# Patient Record
(demographics unavailable — no encounter records)

---

## 2024-10-11 NOTE — HISTORY OF PRESENT ILLNESS
[FreeTextEntry1] : Ms. Hay is a 43-year-old female presenting to establish care for her right knee and neck pain. She gives a chronic history of having right knee pain. She has been seen by Orthopedics and was told that due to her insurance she is unable to undergo injections. She notes pain around the medial aspect of the knee. Pain is made worse with weight bearing or going up the stairs. Pain has been present for many years however over the last couple of days, the pain has been progressing.   With regards to her neck pain, she has been presenting with worsening pain. She has undergone 6 weeks of physical therapy which did relieve her pain however the pain has come back and is progressing. She does work for [employer] which flares up her pain daily due to the constant lifting of heavy objects. She notes pain which is on the left side of the neck and refers into the shoulder and into the left worse than right upper extremity. She notes stiffness, spasms, sharp and shooting pain. She also complains of numbness and tingling all the way into the fingers. Symptoms are present daily. She denies any changes in bowel/bladder habits, fevers/chills or any recent weight loss.   Of note, she has Diabetes and is on Insulin. She monitors her sugar levels daily. Her sugar level this morning was 118.   8-: Revisit encounter.   With regards to her right knee pain, she underwent a right knee injection at her last visit and only notes about 50% relief for about 2 weeks. She continues with ongoing throbbing pain. Pain continues to be around the medial aspect of the knee. Pain is made worse with weight bearing movements especially with going up and down the stairs. She notes instability with walking along with swelling. She also notes her leg locking up if her leg is in an extended position for long periods of time. Symptoms are present daily.   With regards to her neck pain, she continues with ongoing pain. Pain is in the cervical spine and refers into the left worse than right upper extremity. Symptoms are associated with sharp, shooting pains. She also continues with significant stiffness in the neck. There is numbness and tingling which refers all the way into the fingers. Her arm is significant tender to the touch or when leaning on any object. She also notes trouble with grasping objects. She tends to drop objects from time to time. Pain is present daily.   9-3-2024: Revisit encounter.   With regards to her right knee pain, she continues today with ongoing pain. She denies any worsening pain or any new injuries. Pain is around the medial aspect of the knee. Her pain continues to be the most severe when weight bearing, the most severe when going up and down the stairs. She does have ongoing swelling in the knees. Pain is present daily.   With regards to her neck pain, she has been experiencing pain which is referring into the left shoulder. She feels like the majority of her pain in mainly in the shoulder and referring into the hand. She has numbness and tingling which refers into the forearm and into the hand. She cannot apply pressure on the arm when the symptoms are severe.   She has been managing her pain with Naproxen 500 mg q12h prn. She notes about 30% sustained relief with this medication.   9-: Revisit encounter.   With regards to her right knee, she was recently seen by Orthopedics and is scheduled to undergo surgical correction in October. She denies any acute changes or any new injuries. Pain continues to be around the medial aspect of the knee. Pain is made worse with weight bearing. Pain is present daily.   With regards to her left shoulder pain, she continues with ongoing pain. Her range of motion is limited as she does feel pain in her shoulder when rotating her shoulder or doing overhead movements. She does have continued pain which starts in the neck and refers into the left arm. There is sharp, shooting pains with some numbness and tingling at times. She has also been experiencing numbness and tingling when she leans on her elbow.   She has been managing her pain with Naproxen 500 mg q12h prn. She affords about 20-30% reduction in pain and increase in function with the use of this medication.   10-9-2024: Revisit encounter.   She is presenting today with a severe flare up of neck pain. She states the neck pain was so bad that she had to go to the ER due to the pain. Her pain has been in the left side of the cervical spine with referred pain into the left shoulder and into the left upper extremity. She also notes pain when rotating the head or tilting in from side to side. Her pain is associated with sharp, shooting, stabbing pains along with weakness in  strength in the left arm. She also notes some numbness and tingling. Her pain is present daily and has significantly impacting her quality of life. She also has a significant amount of pain when trying to lift her up arm. She cannot extend her are fully up as she gets sharp pains in her shoulder. She has an appointment with Neurosurgery on 10-.   Since her last visit, she was seen by Orthopedics for her right knee pain.

## 2024-10-11 NOTE — DISCUSSION/SUMMARY
[de-identified] : A discussion regarding available pain management treatment options occurred with the patient. These included interventional, rehabilitative, pharmacological, and alternative modalities. We will proceed with the following:    Interventional treatment options: 1. Proceed with a C7-T1 cervical epidural steroid injection with sedation.  Treatment options were discussed. The patient has been having persistent, severe neck pain and cervical radicular pain that has minimally improved with conservative therapy thus far (including physical therapy/chiropractic manipulations/home stretching and anti-inflammatory medications for 8 weeks. The patient was given the option of proceeding with a cervical epidural steroid injection to try to get the patient some pain relief.    The patient has severe anxiety of procedures that necessitates monitored anesthesia care (MAC). The procedure performed will be close to major nerves, arteries, and spinal cord and/or joint structures. Due to the proximity of these structures, we need the patient to be still during the procedure.  With the help of MAC, this will be safely achieved and decrease the risk of any complications.   Medications: 1. Ordered Gabapentin 300 mg q8h prn.   We are going to start gabapentin. Potential side effects were discussed which included dizziness, sedation, and pedal edema to name a few. If the patient cannot tolerate these side effects should they occur, then they will stop the medication. If the patient experiences sedation, they understand that they should not drive. The usage of the medication was discussed and the patient understands that it is an anti-epileptic medication used for neuropathic pain and that the pain relief from this medication will likely be subtle, and that hopefully in combination with the other treatments we are offering we will be able to get some additive relief.   - Follow up 2 weeks post injection.   I Mary Ellen Mcgee attest that this documentation has been prepared under the direction and in the presence of provider Dr. Geovanny Elliott.  The documentation recorded by the scribe in my presence, accurately reflects the service I personally performed, and the decisions made by me with my edits as appropriate.   Geovanny Elliott, DO

## 2024-10-11 NOTE — DATA REVIEWED
[FreeTextEntry1] : MRI of the cervical spine taken on 8-3-2024 showed IMPRESSION: No significant change in comparison to prior MRI of 7/19/2022. Mild multilevel degenerative changes and disc bulging without significant spinal canal or neuroforaminal narrowing as further detailed above.  MRI of the right knee taken on 9-3-2024 showed IMPRESSION: Osteoarthritic changes. Joint effusion. Bone bruises within the medial femoral condyle subchondral region and more significant bone bruise lateral tibial plateau. Possible insufficiency fracture. Extensive complex tear of the lateral meniscus suggestion of a flipped meniscal fragment. Bowing iliotibial band is suggestion of associated sprain. Possible tear periphery posterior horn the medial meniscus at junction with capsule. Prominent red marrow. Correlate with underlying anemia or history of smoking.  MRI of the left shoulder taken @ [facility] on 9- showed Os acromiae, with fluid in the synchondrosis and bone marrow edema across the synchondrosis. Supraspinatus and infraspinatus insertional tendinosis with partial thick ness anterior infraspinatus interstitial tear comprising less than 50% of tendon thickness.   EMG of the upper extremities taken on 9- showed evidence of median nerve compression at both wrists, consistent with bilateral carpal tunnel syndrome, mild. Evidence of left ulnar neuropathy at the elbow.

## 2024-10-11 NOTE — PHYSICAL EXAM
[de-identified] : Cervical Spine Exam:   Inspection:  erythema (-)   ecchymosis (-)  rashes (-)       Palpation:   Cervical paraspinal mm tenderness: R (-); L(-)  Upper trapezius mm tenderness: R (-); L (-)   Rhomboids tenderness: R (-); L (-)  Scalenes mm tenderness: R (-); L (-)  Occipital Ridge: R (-); L (-)  Supraspinatus mm tenderness: R (-); L (-)   ROM:  limited rom 2/2 to pain   Strength Testing:  Right Left  Deltoid (5/5) (5/5)  Biceps: (5/5) (5/5)   Triceps: (5/5) (4/5)   Finger Abductors: (5/5) (5/5)   Grasp: (5/5) (4/5)       Special Testing:  Spurling Test: R (-); L (-)  Facet load test: R (-); L (-)  Right knee: Inspection/palpation   Negative swelling, erythema, warmth  Negative tenderness to palpation   Negative crepitus    ROM:   Flexion 135 (normal 120-150)  Extension 0 (normal 0-15)   Tests:   Negative anterior drawer test   Negative Prachi's test  There is no varus or valgus instability  Quad strength is 5/5, hamstring strength is 5/5   R knee  No rashes, erythema, mild edema noted   TTP at medial joint line Stability: + instability with weight bearing ROM: Painful ROM with flexion Gait: cautious, antalgic  L shoulder exam  No rashes, erythema, edema  TTP at anterior & posterior GH joint Limited ROM 2/2 to pain Lyman sign: negative O'briens test: negative LUE: 5/5 strength except 4+/5 deltoid

## 2024-10-11 NOTE — DATA REVIEWED
[FreeTextEntry1] : MRI of the cervical spine taken on 8-3-2024 showed IMPRESSION: No significant change in comparison to prior MRI of 7/19/2022. Mild multilevel degenerative changes and disc bulging without significant spinal canal or neuroforaminal narrowing as further detailed above.  MRI of the right knee taken on 9-3-2024 showed IMPRESSION: Osteoarthritic changes. Joint effusion. Bone bruises within the medial femoral condyle subchondral region and more significant bone bruise lateral tibial plateau. Possible insufficiency fracture. Extensive complex tear of the lateral meniscus suggestion of a flipped meniscal fragment. Bowing iliotibial band is suggestion of associated sprain. Possible tear periphery posterior horn the medial meniscus at junction with capsule. Prominent red marrow. Correlate with underlying anemia or history of smoking.  MRI of the left shoulder taken @ [facility] on 9- showed Os acromiae, with fluid in the synchondrosis and bone marrow edema across the synchondrosis. Supraspinatus and infraspinatus insertional tendinosis with partial thick ness anterior infraspinatus interstitial tear comprising less than 50% of tendon thickness.   EMG of the upper extremities taken on 9- showed evidence of median nerve compression at both wrists, consistent with bilateral carpal tunnel syndrome, mild. Evidence of left ulnar neuropathy at the elbow.

## 2024-10-11 NOTE — PHYSICAL EXAM
[de-identified] : Cervical Spine Exam:   Inspection:  erythema (-)   ecchymosis (-)  rashes (-)       Palpation:   Cervical paraspinal mm tenderness: R (-); L(-)  Upper trapezius mm tenderness: R (-); L (-)   Rhomboids tenderness: R (-); L (-)  Scalenes mm tenderness: R (-); L (-)  Occipital Ridge: R (-); L (-)  Supraspinatus mm tenderness: R (-); L (-)   ROM:  limited rom 2/2 to pain   Strength Testing:  Right Left  Deltoid (5/5) (5/5)  Biceps: (5/5) (5/5)   Triceps: (5/5) (4/5)   Finger Abductors: (5/5) (5/5)   Grasp: (5/5) (4/5)       Special Testing:  Spurling Test: R (-); L (-)  Facet load test: R (-); L (-)  Right knee: Inspection/palpation   Negative swelling, erythema, warmth  Negative tenderness to palpation   Negative crepitus    ROM:   Flexion 135 (normal 120-150)  Extension 0 (normal 0-15)   Tests:   Negative anterior drawer test   Negative Prachi's test  There is no varus or valgus instability  Quad strength is 5/5, hamstring strength is 5/5   R knee  No rashes, erythema, mild edema noted   TTP at medial joint line Stability: + instability with weight bearing ROM: Painful ROM with flexion Gait: cautious, antalgic  L shoulder exam  No rashes, erythema, edema  TTP at anterior & posterior GH joint Limited ROM 2/2 to pain Lyman sign: negative O'briens test: negative LUE: 5/5 strength except 4+/5 deltoid

## 2024-10-11 NOTE — DISCUSSION/SUMMARY
[de-identified] : A discussion regarding available pain management treatment options occurred with the patient. These included interventional, rehabilitative, pharmacological, and alternative modalities. We will proceed with the following:    Interventional treatment options: 1. Proceed with a C7-T1 cervical epidural steroid injection with sedation.  Treatment options were discussed. The patient has been having persistent, severe neck pain and cervical radicular pain that has minimally improved with conservative therapy thus far (including physical therapy/chiropractic manipulations/home stretching and anti-inflammatory medications for 8 weeks. The patient was given the option of proceeding with a cervical epidural steroid injection to try to get the patient some pain relief.    The patient has severe anxiety of procedures that necessitates monitored anesthesia care (MAC). The procedure performed will be close to major nerves, arteries, and spinal cord and/or joint structures. Due to the proximity of these structures, we need the patient to be still during the procedure.  With the help of MAC, this will be safely achieved and decrease the risk of any complications.   Medications: 1. Ordered Gabapentin 300 mg q8h prn.   We are going to start gabapentin. Potential side effects were discussed which included dizziness, sedation, and pedal edema to name a few. If the patient cannot tolerate these side effects should they occur, then they will stop the medication. If the patient experiences sedation, they understand that they should not drive. The usage of the medication was discussed and the patient understands that it is an anti-epileptic medication used for neuropathic pain and that the pain relief from this medication will likely be subtle, and that hopefully in combination with the other treatments we are offering we will be able to get some additive relief.   - Follow up 2 weeks post injection.   I Mary Ellen Mcgee attest that this documentation has been prepared under the direction and in the presence of provider Dr. Geovanny Elliott.  The documentation recorded by the scribe in my presence, accurately reflects the service I personally performed, and the decisions made by me with my edits as appropriate.   Geovanny Elliott, DO

## 2024-10-30 NOTE — PROCEDURE
[FreeTextEntry3] : Date of Service: 10/28/2024   Account: [id number]  Patient: SHASHA WILEY   YOB: 1980  Age: 44 year  Surgeon:      Geovanny Elliott D.O.  Assistant:    None  Pre-Operative Diagnosis:         Cervical Radiculopathy (M54.12)  Post Operative Diagnosis:       Cervical Radiculopathy (M54.12)  Procedure:             Cervical (C7-T1) interlaminar epidural steroid injection under fluoroscopic guidance  Anesthesia:  MAC  This procedure was carried out using fluoroscopic guidance.  The risks and benefits of the procedure were discussed extensively with the patient.  The consent of the patient was obtained and the following procedure was performed. The patient was placed in the prone position on the fluoroscopy table and the area was prepped and draped in a sterile fashion.  A timeout was performed with all essential staff present and the site and side were verified.  The patient was placed in the prone position and optimized to patient comfort.  The cervical area was prepped and draped in a sterile fashion.  The fluoroscope visualized the C7-T1 interspace using slight cephalad-caudad angulation and this area was marked.  Using sterile technique the superficial skin was anesthetized with 1% Lidocaine.  A 20 gauge 3.5 inch Tuohy needle was advanced under fluoroscopy until ligament was engaged.  Using a contralateral oblique view, a "loss of resistance" to air technique was utilized in order to gain access to the epidural space.  After negative aspiration for heme and CSF, 1 cc of Omnipaque contrast was administered and the appropriate cervical epidurogram was obtained in the ALFREDO and A/P view as well as digital subtraction angiography.  A total injectate of 3 cc of preservative free normal saline and 10mg decadron was then injected into the epidural space while maintaining meaningful verbal contact with the patient.    The needle was subsequently removed.  Vital signs remained normal.  Pulse oximeter was used throughout the procedure and the patient's pulse and oxygen saturation remained within normal limits.  The patient tolerated the procedure well.  There were no complications.  The patient was instructed to apply ice over the injection sites for twenty minutes every two hours for the next 24 to 48 hours.  Disposition:       1. The patient was advised to F/U in 1-2 weeks to assess the response to the injection.      2. The patient was also instructed to contact me immediately if there were any concerns related to the procedure performed.

## 2024-11-20 NOTE — PHYSICAL EXAM
[de-identified] : Cervical Spine Exam:   Inspection:  erythema (-)   ecchymosis (-)  rashes (-)       Palpation:   Cervical paraspinal mm tenderness: R (-); L(-)  Upper trapezius mm tenderness: R (-); L (-)   Rhomboids tenderness: R (-); L (-)  Scalenes mm tenderness: R (-); L (-)  Occipital Ridge: R (-); L (-)  Supraspinatus mm tenderness: R (-); L (-)   Strength Testing:  Right Left  Deltoid (5/5) (5/5)  Biceps: (5/5) (5/5)   Triceps: (5/5) (4/5)   Finger Abductors: (5/5) (5/5)   Grasp: (5/5) (4/5)       Special Testing:  Spurling Test: R (-); L (-)  Facet load test: R (-); L (-)

## 2024-11-20 NOTE — DISCUSSION/SUMMARY
[de-identified] : A discussion regarding available pain management treatment options occurred with the patient. These included interventional, rehabilitative, pharmacological, and alternative modalities. We will proceed with the following:    Interventional treatment options: 1. None indicated at this time   Medications: 1. C/W Gabapentin 300 mg q8h prn.  Potentil side effects were discussed which included dizziness, sedation, and pedal edema to name a few. If the patient cannot tolerate these side effects should they occur, then they will stop the medication. If the patient experiences sedation, they understand that they should not drive. The usage of the medication was discussed and the patient understands that it is an anti-epileptic medication used for neuropathic pain and that the pain relief from this medication will likely be subtle, and that hopefully in combination with the other treatments we are offering we will be able to get some additive relief.  2. C/W Naproxen 500 mg BID PRN  - I advised the patient that the NSAID should be taken with food. In addition, while taking the prescribed NSAID, no over the counter or other NSAIDs should be used, such as ibuprofen (Motrin or Advil) or naproxen (Aleve) as this can cause stomach upset or other side effects. If needed for fever or breakthrough pain Tylenol can be used.  - Follow up in 8 weeks (pt knows to call our office in the interim if the experiences any pain)   LORI Hercules DO

## 2024-11-20 NOTE — DATA REVIEWED
[FreeTextEntry1] : MRI of the cervical spine taken on 8-3-2024 showed IMPRESSION: No significant change in comparison to prior MRI of 7/19/2022. Mild multilevel degenerative changes and disc bulging without significant spinal canal or neuroforaminal narrowing as further detailed above.  MRI of the right knee taken on 9-3-2024 showed IMPRESSION: Osteoarthritic changes. Joint effusion. Bone bruises within the medial femoral condyle subchondral region and more significant bone bruise lateral tibial plateau. Possible insufficiency fracture. Extensive complex tear of the lateral meniscus suggestion of a flipped meniscal fragment. Bowing iliotibial band is suggestion of associated sprain. Possible tear periphery posterior horn the medial meniscus at junction with capsule. Prominent red marrow. Correlate with underlying anemia or history of smoking.  MRI of the left shoulder taken @ [facility] on 9- showed Os acromiae, with fluid in the synchondrosis and bone marrow edema across the synchondrosis. Supraspinatus and infraspinatus insertional tendinosis with partial thick ness anterior infraspinatus interstitial tear comprising less than 50% of tendon thickness.   EMG of the upper extremities taken on 9- showed evidence of median nerve compression at both wrists, consistent with bilateral carpal tunnel syndrome, mild. Evidence of left ulnar neuropathy at the elbow.

## 2024-12-04 NOTE — ASSESSMENT
[FreeTextEntry1] : This is a 44-year-old female who presents for neurosurgical revisit with regards to cervical radiculopathy affecting the left upper extremity.  Since our her last encounter she has undergone an epidural injection and has also quit her job at [employer] and notes excellent relief with a near complete resolution of her left upper extremity radiculopathy.  She is to initiate conservative efforts in the form of physical therapy but will likely start after the next 2 weeks that she is pending right knee surgery.  At this time, there is no indication for neurosurgical intervention as she is improving through conservative/interventional efforts.  I will have her return to our office in 2 months to outline her continued improvements and to determine her continued treatment plan.  All questions and concerns have been answered at this time and she is largely agreeable with the proposed course of action.  LORI Noriega MD

## 2024-12-04 NOTE — HISTORY OF PRESENT ILLNESS
[FreeTextEntry1] : This is a 44-year-old female who presents for neurosurgical revisit.  She was seen in the office approximately 6 to 8 weeks prior with reports of severe cervical radiculopathy affecting the left upper extremity.  Since our last encounter she has undergone an epidural with pain management and has also quit her job at [employer].  She notes excellent relief with significant improvements in the intensity of her radiating pain involving the left arm.  At this time, she notes a painful paresthesia involving the left thumb but otherwise no true radicular complaints reported.  Left shoulder pain continues as well as right knee pain.  She is pending meniscal repair to the right knee which will take place on November 23, 2024.  With regards to her left shoulder, she has been advised to initiate conservative efforts in the form of PT.  IMAGES: MR SPINE CERVICAL ([facility] 08/03/2024) 1. No significant change in comparison to prior MRI of 7/19/2022. 2. Mild multilevel degenerative changes and disc bulging without significant spinal canal or neuroforaminal narrowing as further detailed above.  MR SHOULDER LT ([facility] 08/03/2024) 1. Osacromiale, with fluid in the synchondrosis and bone marrow edema across the synchondrosis. Correlate clinically for symptoms of pain. 2.Supraspinatus and infraspinatus insertional tendinosis with partial-thickness anterior infraspinatus interstitial tear comprising less than 50% of tendon thickness.  EXAM: Neurologic: CN II-XII grossly intact Palpation: L anterior shoulder pain Strength: Full strength in all major muscle groups, no atrophy Sensation: Full sensation to light touch in all extremities  Reflexes: 2+ patellar 2+ biceps   ROM limited in the left shoulder through all planes with passive movement  Signs: SLR negative; Ronald's maneuver negative  tenderness over left shoulder  Gait: WNL

## 2024-12-31 NOTE — DISCUSSION/SUMMARY
[de-identified] : Postop visit #1  HPI Patient is a 44-year-old female who reports to the office for her first postoperative visit.  She is status post right knee arthroscopy/lateral meniscectomy done on 12/23/2024 by Dr. Staples.  She is doing well and her pain has been well-controlled.  She has been ambulating with a cane.  Certain range of motion and palpating certain areas aggravate the patient's pain.  Denies any numbness or tingling.  Right knee exam is as follows: Minimal effusion noted.  No erythema or ecchymosis.  Incision C/D/I.  No sutures present as absorbable ones were used.  Able to perform active straight leg raise.  Knee flexion from 0 to 90 degrees with mild stiffness and pain.  Calf is soft and nontender.  Light touch intact throughout.  Nonantalgic gait.  Assessment/plan Patient is doing well status post surgery. Incision sites were covered with Steri-Strips.  Explained that these will fall off on their own in the shower.  She may take OTC Tylenol or Motrin as needed for pain.  A script for physical therapy was printed for the patient so they can get started on that.  The patient was advised to rest/ice the area and may alternate with warm compresses as needed.  Continue to weight-bear as tolerated using cane if needed.  Follow-up in 6 weeks.  All questions/concerns were answered in detail.

## 2025-02-06 NOTE — DISCUSSION/SUMMARY
[de-identified] : A discussion regarding available pain management treatment options occurred with the patient. These included interventional, rehabilitative, pharmacological, and alternative modalities. We will proceed with the following:    Interventional treatment options: 1. None indicated at this time   Medications: 1. Ordered Meloxicam 15 mg qD prn (30-day supply, 30 tablets) I advised Ms. Hay that the NSAID should be taken with food.  In addition while taking the prescribed NSAID, no over the counter or other NSAIDs should be used, such as ibuprofen (Motrin or Advil) or naproxen (Aleve) as this can cause stomach upset or other side effects.  If needed for fever or breakthrough pain Tylenol can be used.

## 2025-02-06 NOTE — PHYSICAL EXAM
[de-identified] : Cervical Spine Exam:   Inspection:  erythema (-)   ecchymosis (-)  rashes (-)       Palpation:   Cervical paraspinal mm tenderness: R (-); L(-)  Upper trapezius mm tenderness: R (-); L (-)   Rhomboids tenderness: R (-); L (-)  Scalenes mm tenderness: R (-); L (-)  Occipital Ridge: R (-); L (-)  Supraspinatus mm tenderness: R (-); L (-)   Strength Testing:  Right Left  Deltoid (5/5) (5/5)  Biceps: (5/5) (5/5)   Triceps: (5/5) (4/5)   Finger Abductors: (5/5) (5/5)   Grasp: (5/5) (4/5)       Special Testing:  Spurling Test: R (-); L (-)  Facet load test: R (-); L (-)

## 2025-02-06 NOTE — HISTORY OF PRESENT ILLNESS
[FreeTextEntry1] : Ms. Hay is a 43-year-old female presenting to establish care for her right knee and neck pain. She gives a chronic history of having right knee pain. She has been seen by Orthopedics and was told that due to her insurance she is unable to undergo injections. She notes pain around the medial aspect of the knee. Pain is made worse with weight bearing or going up the stairs. Pain has been present for many years however over the last couple of days, the pain has been progressing.   With regards to her neck pain, she has been presenting with worsening pain. She has undergone 6 weeks of physical therapy which did relieve her pain however the pain has come back and is progressing. She does work for [employer] which flares up her pain daily due to the constant lifting of heavy objects. She notes pain which is on the left side of the neck and refers into the shoulder and into the left worse than right upper extremity. She notes stiffness, spasms, sharp and shooting pain. She also complains of numbness and tingling all the way into the fingers. Symptoms are present daily. She denies any changes in bowel/bladder habits, fevers/chills or any recent weight loss.   Of note, she has Diabetes and is on Insulin. She monitors her sugar levels daily. Her sugar level this morning was 118.   8-: Revisit encounter.   With regards to her right knee pain, she underwent a right knee injection at her last visit and only notes about 50% relief for about 2 weeks. She continues with ongoing throbbing pain. Pain continues to be around the medial aspect of the knee. Pain is made worse with weight bearing movements especially with going up and down the stairs. She notes instability with walking along with swelling. She also notes her leg locking up if her leg is in an extended position for long periods of time. Symptoms are present daily.   With regards to her neck pain, she continues with ongoing pain. Pain is in the cervical spine and refers into the left worse than right upper extremity. Symptoms are associated with sharp, shooting pains. She also continues with significant stiffness in the neck. There is numbness and tingling which refers all the way into the fingers. Her arm is significant tender to the touch or when leaning on any object. She also notes trouble with grasping objects. She tends to drop objects from time to time. Pain is present daily.   9-3-2024: Revisit encounter.   With regards to her right knee pain, she continues today with ongoing pain. She denies any worsening pain or any new injuries. Pain is around the medial aspect of the knee. Her pain continues to be the most severe when weight bearing, the most severe when going up and down the stairs. She does have ongoing swelling in the knees. Pain is present daily.   With regards to her neck pain, she has been experiencing pain which is referring into the left shoulder. She feels like the majority of her pain in mainly in the shoulder and referring into the hand. She has numbness and tingling which refers into the forearm and into the hand. She cannot apply pressure on the arm when the symptoms are severe.   She has been managing her pain with Naproxen 500 mg q12h prn. She notes about 30% sustained relief with this medication.   9-: Revisit encounter.   With regards to her right knee, she was recently seen by Orthopedics and is scheduled to undergo surgical correction in October. She denies any acute changes or any new injuries. Pain continues to be around the medial aspect of the knee. Pain is made worse with weight bearing. Pain is present daily.   With regards to her left shoulder pain, she continues with ongoing pain. Her range of motion is limited as she does feel pain in her shoulder when rotating her shoulder or doing overhead movements. She does have continued pain which starts in the neck and refers into the left arm. There is sharp, shooting pains with some numbness and tingling at times. She has also been experiencing numbness and tingling when she leans on her elbow.   She has been managing her pain with Naproxen 500 mg q12h prn. She affords about 20-30% reduction in pain and increase in function with the use of this medication.   10-9-2024: Revisit encounter.   She is presenting today with a severe flare up of neck pain. She states the neck pain was so bad that she had to go to the ER due to the pain. Her pain has been in the left side of the cervical spine with referred pain into the left shoulder and into the left upper extremity. She also notes pain when rotating the head or tilting in from side to side. Her pain is associated with sharp, shooting, stabbing pains along with weakness in  strength in the left arm. She also notes some numbness and tingling. Her pain is present daily and has significantly impacting her quality of life. She also has a significant amount of pain when trying to lift her up arm. She cannot extend her are fully up as she gets sharp pains in her shoulder. She has an appointment with Neurosurgery on 10-.   Since her last visit, she was seen by Orthopedics for her right knee pain.    TODAY (11/20/24)  Pt is a 44 year old F here for a f/u S/p MEG done on 10/28/24 which gave her 50-60% relief, she still experiencing some numbness and tingling in her LT hand but overall her radicular feature of her LT arm has resolved, She does admit to having some tightness and axial back pain at night time.  IMAGES: MR SPINE CERVICAL ([facility] 08/03/2024): Mild multilevel degenerative changes and disc bulging without significant spinal canal or neuroforaminal narrowing as further detailed above. She is medically managed with Gabapentin 300 mg BID, Naproxen 500 mg BID PRN and cyclobenzaprine at night time.   Of note, she is getting a RT knee replacement Dec 23rd, 2024  2-6-2025: Revisit encounter.   She is presenting today with ongoing pain in the right knee. She recently underwent surgical correction to the right knee in June. She does continue with a constant ache in the knee which is made worse when transitioning from a seated to standing position. She does note swelling in the knee. She is currently in physical therapy twice weekly. She continues to manage her pain with Gabapentin 300 mg q8h and Advil liquid gel.   With regards to her neck pain, she states the pain is currently manageable. She does note pain in her left shoulder. She notes weakness when lifting up her arm or when trying to  objects. She is currently enrolled in physical therapy for the left shoulder.

## 2025-02-06 NOTE — DATA REVIEWED
[FreeTextEntry1] : MRI of the cervical spine taken on 8-3-2024 showed IMPRESSION: No significant change in comparison to prior MRI of 7/19/2022. Mild multilevel degenerative changes and disc bulging without significant spinal canal or neuroforaminal narrowing as further detailed above.  MRI of the right knee taken on 9-3-2024 showed IMPRESSION: Osteoarthritic changes. Joint effusion. Bone bruises within the medial femoral condyle subchondral region and more significant bone bruise lateral tibial plateau. Possible insufficiency fracture. Extensive complex tear of the lateral meniscus suggestion of a flipped meniscal fragment. Bowing iliotibial band is suggestion of associated sprain. Possible tear periphery posterior horn the medial meniscus at junction with capsule. Prominent red marrow. Correlate with underlying anemia or history of smoking.  MRI of the left shoulder taken @ [facility] on 9- showed Os acromiae, with fluid in the synchondrosis and bone marrow edema across the synchondrosis. Supraspinatus and infraspinatus insertional tendinosis with partial thick ness anterior infraspinatus interstitial tear comprising less than 50% of tendon thickness.   EMG of the upper extremities taken on 9- showed evidence of median nerve compression at both wrists, consistent with bilateral carpal tunnel syndrome, mild. Evidence of left ulnar neuropathy at the elbow.

## 2025-02-17 NOTE — HISTORY OF PRESENT ILLNESS
[de-identified] : Patient is s/p right knee arthroscopy.  Doing well. Pain controlled.  NAD Right knee: Incisions healed, c/d/i Mild swelling ROM 0-120 Neg Instability NVI Compartments soft and NT  s/p right knee arthroscopy Went over surgery in detail cont pt Continue pain control All questions answered

## 2025-02-17 NOTE — HISTORY OF PRESENT ILLNESS
[de-identified] : Patient is s/p right knee arthroscopy.  Doing well. Pain controlled.  NAD Right knee: Incisions healed, c/d/i Mild swelling ROM 0-120 Neg Instability NVI Compartments soft and NT  s/p right knee arthroscopy Went over surgery in detail cont pt Continue pain control All questions answered

## 2025-03-24 NOTE — HISTORY OF PRESENT ILLNESS
[FreeTextEntry1] : 43 y/o female for annual  lmp-different-pt bleeding q 2 weeks last eval + fibroids, bx secreotry endometrium 1/2024 hba1c>9 no surgery at that time  pt reports hba1c 7.9   [Y] : Patient uses contraception [Tubal Occlusion] : has had a tubal occlusion [Mammogramdate] : 24 [PapSmeardate] : today

## 2025-03-24 NOTE — DISCUSSION/SUMMARY
[FreeTextEntry1] : #1 health maintenance  #2 aub fibroids needs enobx redone-pt decling today will come back tvs bloodwork + hba1c

## 2025-03-24 NOTE — PHYSICAL EXAM
[Examination Of The Breasts] : a normal appearance [No Masses] : no breast masses were palpable [Labia Majora] : normal [Labia Minora] : normal [Discharge] : a  ~M vaginal discharge was present [Moderate] : moderate [White] : white [Cheesy] : cheesy [Normal] : normal [Enlarged ___ wks] : enlarged [unfilled] ~Uweeks [Uterine Adnexae] : normal

## 2025-04-18 NOTE — DISCUSSION/SUMMARY
[de-identified] : Right knee pain  HPI Patient is a 44-year-old female who reports to the office for subsequent evaluation of her right knee pain.  She underwent a left knee arthroscopy/meniscectomy done on 12/23/2024 by Dr. Staples.  She has been undergoing formal physical therapy which has been giving her some relief.  Still admits to knee stiffness.  Walking, certain range of motion, palpating certain areas aggravate the patient's pain at times.  Denies any buckling, locking, or instability.  Has not taken any medication to help alleviate her symptoms.  Denies any numbness or tingling.  Right knee exam is as follows: Minimal effusion noted.  No erythema or ecchymosis.  Able to perform active straight leg raise.  Knee flexion from 0 to 110 degrees with stiffness and pain.  Patellofemoral, medial/lateral joint line tenderness palpation.  Calf is soft nontender.  Light touch intact throughout.  Mildly antalgic gait.  Ambulation with cane.  Assessment/plan Explained to the patient that her pain is clinically arthritic in nature status post knee arthroscopy.  Weight loss was encouraged.  Mobic 15 mg PO QD PRN was sent to patient's pharmacy to help alleviate their symptoms.  The patient was advised to rest/ice the area and may alternate with warm compresses as needed.    New PT prescription was provided so she may continue that as directed.  She is a diabetic, so cortisone injection is not a good option at this time.  Patient also stated that viscosupplementation injections were denied by her insurance in the past.  Follow-up on as-needed basis.  All question/concerns were answered in detail.

## 2025-05-19 NOTE — PHYSICAL EXAM
[Labia Majora] : normal [Labia Minora] : normal [Discharge] : a  ~M vaginal discharge was present [Moderate] : moderate [White] : white [Cheesy] : cheesy [Normal] : normal [Enlarged ___ wks] : enlarged [unfilled] ~Uweeks [Uterine Adnexae] : normal [FreeTextEntry4] : yeast

## 2025-05-19 NOTE — HISTORY OF PRESENT ILLNESS
[FreeTextEntry1] : 45 y/o female with aub for bx pt c/o yeast infection  hb a1c > 8-discussed risks of surgery

## 2025-07-25 NOTE — HISTORY OF PRESENT ILLNESS
Patient Information     Patient Name MRN Fernando Mina [id number] Male 1950      Patient Instructions by Brandie Pittman PA-C at 10/11/2017 10:15 AM     Author:  Brandie Pittman PA-C Service:  (none) Author Type:  PHYS- Physician Assistant     Filed:  10/11/2017 10:42 AM Encounter Date:  10/11/2017 Status:  Signed     :  Brandie Pittman PA-C (PHYS- Physician Assistant)            Left hip:   Use warm compresses and triple antibiotic ointment.   Watch for any signs of increasing infection (redness, pus, increased pain, increased swelling or fever). Call if any of these signs occur. Monitor for fevers or chills.  Call or return to clinic as needed if your symptoms worsen or fail to improve as anticipated.    Left lower leg:  Started on triamcinolone.   Use for 2-4 weeks until resolved.   Return to clinic with change/worsening of symptoms.          [FreeTextEntry1] : PATIENT GOT DEPRESSED, STOPPED ALL MEDS, starting all over again

## 2025-07-25 NOTE — END OF VISIT
[Time Spent: ___ minutes] : I have spent [unfilled] minutes of time on the encounter which excludes teaching and separately reported services.
[Time Spent: ___ minutes] : I have spent [unfilled] minutes of time on the encounter which excludes teaching and separately reported services.
home

## 2025-07-25 NOTE — ASSESSMENT
[Diabetes Foot Care] : diabetes foot care [Long Term Vascular Complications] : long term vascular complications of diabetes [Carbohydrate Consistent Diet] : carbohydrate consistent diet [Importance of Diet and Exercise] : importance of diet and exercise to improve glycemic control, achieve weight loss and improve cardiovascular health [Hypoglycemia Management] : hypoglycemia management [Retinopathy Screening] : Patient was referred to ophthalmology for retinopathy screening [FreeTextEntry1] : start meds again. advised abot non compliance, needs repeat labs in future.